# Patient Record
Sex: MALE | Race: WHITE | Employment: UNEMPLOYED | URBAN - METROPOLITAN AREA
[De-identification: names, ages, dates, MRNs, and addresses within clinical notes are randomized per-mention and may not be internally consistent; named-entity substitution may affect disease eponyms.]

---

## 2024-07-17 ENCOUNTER — HOSPITAL ENCOUNTER (EMERGENCY)
Facility: HOSPITAL | Age: 29
Discharge: HOME | End: 2024-07-17
Attending: EMERGENCY MEDICINE
Payer: MEDICAID

## 2024-07-17 ENCOUNTER — APPOINTMENT (OUTPATIENT)
Dept: GENERAL RADIOLOGY | Facility: HOSPITAL | Age: 29
End: 2024-07-17
Attending: EMERGENCY MEDICINE
Payer: MEDICAID

## 2024-07-17 VITALS
DIASTOLIC BLOOD PRESSURE: 84 MMHG | SYSTOLIC BLOOD PRESSURE: 135 MMHG | OXYGEN SATURATION: 96 % | HEIGHT: 70 IN | RESPIRATION RATE: 16 BRPM | WEIGHT: 160 LBS | BODY MASS INDEX: 22.9 KG/M2 | TEMPERATURE: 98 F | HEART RATE: 89 BPM

## 2024-07-17 DIAGNOSIS — S93.401A SEVERE SPRAIN OF RIGHT ANKLE, INITIAL ENCOUNTER: Primary | ICD-10-CM

## 2024-07-17 PROCEDURE — 73630 X-RAY EXAM OF FOOT: CPT | Performed by: EMERGENCY MEDICINE

## 2024-07-17 PROCEDURE — 99283 EMERGENCY DEPT VISIT LOW MDM: CPT

## 2024-07-17 PROCEDURE — 73610 X-RAY EXAM OF ANKLE: CPT | Performed by: EMERGENCY MEDICINE

## 2024-07-17 PROCEDURE — 99284 EMERGENCY DEPT VISIT MOD MDM: CPT

## 2024-07-17 RX ORDER — GABAPENTIN 300 MG/1
300 CAPSULE ORAL
COMMUNITY
Start: 2024-02-14

## 2024-07-18 NOTE — ED INITIAL ASSESSMENT (HPI)
Patient here suspecting her broke his right ankle. States he was stepping off a ledge maybe 2' of the ground and he landed with his ankle on it's side. Incident happed around 0800, unable to bare weight on it, CMS intact.

## 2024-07-18 NOTE — ED PROVIDER NOTES
Patient Seen in: [facility] Emergency Department      History     Chief Complaint   Patient presents with    Leg or Foot Injury     Stated Complaint: ankle injury    Subjective:   HPI    Patient is a 29-year-old male states this morning he stepped off a ledge 2 to 3 feet and did not realize it was that high and twisted his right ankle.  Patient complains of pain to his right ankle as well as some to his right foot.  Patient denies other injuries.  Patient complains of increased pain and swelling throughout the day.  Patient having trouble bearing weight on it.  No other injuries.  Remainder of review of systems negative.  Patient did take ibuprofen prior to coming in.    Objective:   Past Medical History:    Anxiety              Past Surgical History:   Procedure Laterality Date    Brain surgery  2020                Social History     Socioeconomic History    Marital status: Single   Tobacco Use    Smoking status: Former     Types: Cigarettes    Smokeless tobacco: Never   Vaping Use    Vaping status: Never Used   Substance and Sexual Activity    Alcohol use: Yes    Drug use: Yes     Types: Cannabis     Comment: daily     Social Determinants of Health     Food Insecurity: Low Risk  (10/2/2023)    Received from [facility]    Food Insecurity     Have there been times that your food ran out, and you didn't have money to get more?: No     Are there times that you worry that this might happen?: No   Transportation Needs: High Risk (10/2/2023)    Received from [facility]    Transportation Needs     Do you have trouble getting transportation to medical appointments?: Yes     How do you normally get to and from your appointments?: Family/Friend              Review of Systems    Positive for stated Chief Complaint: Leg or Foot Injury    Other systems are as noted in HPI.  Constitutional and vital signs  reviewed.      All other systems reviewed and negative except as noted above.    Physical Exam     ED Triage Vitals [07/17/24 2217]   /84   Pulse 89   Resp 16   Temp 98.4 °F (36.9 °C)   Temp src Oral   SpO2 96 %   O2 Device None (Room air)       Current Vitals:   Vital Signs  BP: 135/84  Pulse: 89  Resp: 16  Temp: 98.4 °F (36.9 °C)  Temp src: Oral  MAP (mmHg): 95    Oxygen Therapy  SpO2: 96 %  O2 Device: None (Room air)            Physical Exam  GENERAL: Patient resting comfortably on the cart in no acute distress.  HEENT: Extraocular muscles intact,  EXTREMITIES: Right lower extremity, knee for range of motion nontender.  Ankle swelling tenderness medial as well as lateral malleolus.  Mild tenderness over the base of the fifth metatarsal.  Good dorsalis pedis pulse.  Sensation tact light touch.  Good capillary refill.  SKIN: No rash, good turgor.  NEURO: Patient answers questions appropriately.  No focal deficits appreciated.           ED Course   Labs Reviewed - No data to display          Right foot No evidence for fracture dislocation.  Hematoma present proximal foot and all around the ankle.  No foreign body.   LOCATION:  [facility]         Right ankle Soft tissue swelling consistent with hematoma. No acute fracture or other acute bony process.   Independent reviewed by myself, no fracture noted         MDM      Patient  declined pain medicines.  Patient x-rays negative for fracture.  Patient does have significant swelling particular to the ankle.  Patient was given stirrup splint.  Patient given crutches.  Recommend ice, elevate, ibuprofen.  Stirrup splint and crutches as discussed.  Follow-up for further evaluation orthopedics.  Return if new or worse symptoms.  I did consider ankle fracture, foot fracture, ankle sprain, foot sprain.                                   Medical Decision Making      Disposition and Plan     Clinical Impression:  1. Severe sprain of right ankle, initial encounter          Disposition:  Discharge  7/17/2024 11:13 pm    Follow-up:  Lombardi, John A, MD  [address] [facility] [address]  [facility] [id number] [phone]    Follow up in 1 week(s)            Medications Prescribed:  Current Discharge Medication List

## 2024-07-18 NOTE — DISCHARGE INSTRUCTIONS
Follow-up for further evaluation with orthopedics as discussed.  Ice, elevate, ibuprofen.  Stirrup splint.  Crutches as needed.  Return if new or worse symptoms.